# Patient Record
Sex: FEMALE | Race: WHITE | URBAN - NONMETROPOLITAN AREA
[De-identification: names, ages, dates, MRNs, and addresses within clinical notes are randomized per-mention and may not be internally consistent; named-entity substitution may affect disease eponyms.]

---

## 2020-03-12 ENCOUNTER — IMPORTED ENCOUNTER (OUTPATIENT)
Dept: URBAN - NONMETROPOLITAN AREA CLINIC 1 | Facility: CLINIC | Age: 75
End: 2020-03-12

## 2020-03-12 PROBLEM — H52.01: Noted: 2020-03-12

## 2020-03-12 PROBLEM — H26.493: Noted: 2020-03-12

## 2020-03-12 PROBLEM — Z96.1: Noted: 2020-03-12

## 2020-03-12 PROBLEM — E11.9: Noted: 2020-03-12

## 2020-03-12 PROBLEM — H52.4: Noted: 2020-03-12

## 2020-03-12 PROBLEM — H52.12: Noted: 2020-03-12

## 2020-03-12 PROBLEM — H52.223: Noted: 2020-03-12

## 2020-03-12 PROCEDURE — 92004 COMPRE OPH EXAM NEW PT 1/>: CPT

## 2020-03-12 PROCEDURE — 92015 DETERMINE REFRACTIVE STATE: CPT

## 2020-03-12 NOTE — PATIENT DISCUSSION
Type II DM w/o Retinopathy (Dx 2004)-  Discused findings w/ pt today-  Currently controlled on oral/insulin medication(s)-  A1C 7.2% 2/2020.-  BS before breakfast this AM was 124. -  Seeing Rain MACK at [address] [facility] in [facility].-  No diabetic retinopathy seen on exam today -  Stressed importance of good blood sugar control and how it can affect overall eye health- monitor yearly or prn Pseudophakia OU w/PCO OS>OD-  discussed findings w/patient-  visually significant with decreased vision -  discussed rferral to  for PCO eval patient denny with plan -  refer to [address] [place] [address] for PCO eval OS>ODCompound Hyperopic Astigmatism OD/Compopund Myopic Astigmatism OS w/Presbyopia-  discussed findings w/patient-  patient defers MR today -  monitor yearly or prn

## 2020-07-22 ENCOUNTER — IMPORTED ENCOUNTER (OUTPATIENT)
Dept: URBAN - NONMETROPOLITAN AREA CLINIC 1 | Facility: CLINIC | Age: 75
End: 2020-07-22

## 2020-07-22 PROCEDURE — 66821 AFTER CATARACT LASER SURGERY: CPT

## 2020-07-22 PROCEDURE — 92014 COMPRE OPH EXAM EST PT 1/>: CPT

## 2020-07-22 NOTE — PATIENT DISCUSSION
PCO-Explained PCO and RBAs of YAG Capsulotomy to pt. -Pt elects to proceed.  YAG Caps OS today and YAG Caps OD in 1-2 weeks.-Consent signed and obtained prior to procedure

## 2020-07-23 PROBLEM — Z96.1: Noted: 2020-07-23

## 2020-07-23 PROBLEM — E11.9: Noted: 2020-07-23

## 2020-07-23 PROBLEM — H26.493: Noted: 2020-07-23

## 2020-07-23 PROBLEM — H52.01: Noted: 2020-03-12

## 2020-07-23 PROBLEM — H52.12: Noted: 2020-03-12

## 2020-07-23 PROBLEM — H52.4: Noted: 2020-03-12

## 2020-07-23 PROBLEM — H52.223: Noted: 2020-03-12

## 2020-08-24 ENCOUNTER — IMPORTED ENCOUNTER (OUTPATIENT)
Dept: URBAN - NONMETROPOLITAN AREA CLINIC 1 | Facility: CLINIC | Age: 75
End: 2020-08-24

## 2020-08-24 PROCEDURE — 66821 AFTER CATARACT LASER SURGERY: CPT

## 2020-09-29 PROBLEM — H52.223: Noted: 2020-03-12

## 2020-09-29 PROBLEM — H26.491: Noted: 2020-09-29

## 2020-09-29 PROBLEM — H52.01: Noted: 2020-03-12

## 2020-09-29 PROBLEM — E11.9: Noted: 2020-07-23

## 2020-09-29 PROBLEM — Z96.1: Noted: 2020-07-23

## 2020-09-29 PROBLEM — H52.12: Noted: 2020-03-12

## 2020-09-29 PROBLEM — H52.4: Noted: 2020-03-12

## 2022-04-10 ASSESSMENT — TONOMETRY
OD_IOP_MMHG: 13
OS_IOP_MMHG: 12
OD_IOP_MMHG: 13
OD_IOP_MMHG: 12
OS_IOP_MMHG: 13
OS_IOP_MMHG: 13

## 2022-04-10 ASSESSMENT — VISUAL ACUITY
OS_GLARE: 20/400
OD_GLARE: 20/400
OD_CC: 20/40
OS_CC: 20/60
OS_PH: 20/50
OU_SC: J3
OD_GLARE: 20/50
OD_PH: 20/30
OD_CC: 20/30
OS_GLARE: 20/200
OS_CC: 20/40
OD_GLARE: 20/200
OS_CC: 20/60
OS_PH: 20/50
OU_CC: 20/40
OD_CC: 20/40